# Patient Record
Sex: MALE | Race: WHITE | Employment: FULL TIME | URBAN - METROPOLITAN AREA
[De-identification: names, ages, dates, MRNs, and addresses within clinical notes are randomized per-mention and may not be internally consistent; named-entity substitution may affect disease eponyms.]

---

## 2024-01-14 ENCOUNTER — HOSPITAL ENCOUNTER (EMERGENCY)
Facility: HOSPITAL | Age: 24
Discharge: HOME | End: 2024-01-14
Attending: EMERGENCY MEDICINE
Payer: COMMERCIAL

## 2024-01-14 VITALS
DIASTOLIC BLOOD PRESSURE: 94 MMHG | OXYGEN SATURATION: 100 % | HEART RATE: 105 BPM | SYSTOLIC BLOOD PRESSURE: 166 MMHG | RESPIRATION RATE: 18 BRPM | TEMPERATURE: 97.8 F

## 2024-01-14 DIAGNOSIS — R36.9 PENILE DISCHARGE: Primary | ICD-10-CM

## 2024-01-14 DIAGNOSIS — N34.2 URETHRITIS: ICD-10-CM

## 2024-01-14 PROCEDURE — 87591 N.GONORRHOEAE DNA AMP PROB: CPT | Performed by: PHYSICIAN ASSISTANT

## 2024-01-14 PROCEDURE — 96372 THER/PROPH/DIAG INJ SC/IM: CPT

## 2024-01-14 PROCEDURE — 87491 CHLMYD TRACH DNA AMP PROBE: CPT | Performed by: PHYSICIAN ASSISTANT

## 2024-01-14 PROCEDURE — 87389 HIV-1 AG W/HIV-1&-2 AB AG IA: CPT | Performed by: PHYSICIAN ASSISTANT

## 2024-01-14 PROCEDURE — 87661 TRICHOMONAS VAGINALIS AMPLIF: CPT | Performed by: PHYSICIAN ASSISTANT

## 2024-01-14 PROCEDURE — 86780 TREPONEMA PALLIDUM: CPT | Performed by: PHYSICIAN ASSISTANT

## 2024-01-14 PROCEDURE — 36415 COLL VENOUS BLD VENIPUNCTURE: CPT | Performed by: PHYSICIAN ASSISTANT

## 2024-01-14 PROCEDURE — 99284 EMERGENCY DEPT VISIT MOD MDM: CPT | Performed by: PHYSICIAN ASSISTANT

## 2024-01-14 PROCEDURE — 99283 EMERGENCY DEPT VISIT LOW MDM: CPT

## 2024-01-14 PROCEDURE — 80074 ACUTE HEPATITIS PANEL: CPT | Performed by: PHYSICIAN ASSISTANT

## 2024-01-14 RX ORDER — DOXYCYCLINE HYCLATE 100 MG/1
100 CAPSULE ORAL ONCE
Status: COMPLETED | OUTPATIENT
Start: 2024-01-14 | End: 2024-01-14

## 2024-01-14 RX ORDER — DOXYCYCLINE HYCLATE 100 MG/1
100 CAPSULE ORAL 2 TIMES DAILY
Qty: 13 CAPSULE | Refills: 0 | Status: SHIPPED | OUTPATIENT
Start: 2024-01-14 | End: 2024-01-21

## 2024-01-14 RX ADMIN — DOXYCYCLINE HYCLATE 100 MG: 100 CAPSULE ORAL at 09:49

## 2024-01-14 RX ADMIN — LIDOCAINE HYDROCHLORIDE 500 MG: 10 INJECTION, SOLUTION EPIDURAL; INFILTRATION; INTRACAUDAL; PERINEURAL at 09:48

## 2024-01-14 NOTE — ED PROVIDER NOTES
History  Chief Complaint   Patient presents with    Penile Discharge     C/o penile discharge x5 days      24yo male with no significant past medical history presenting for evaluation of penile discharge x 5 days. Patient is uncircumcised and his wife currently has a yeast infection. He typically gets a yeast infection as well so he has been using an OTC cream with improvement. He started experiencing penile discharge a few days ago which is mostly clear but is milky white at times. He had leftover amoxicillin at home and he took a dose. He admits to have multiple sexual partners and is worried about STDs. He was seen at urgent care this morning and had a urinalysis which showed trace leukocytes and trace protein. He was advised to go to the ED for evaluation. He denies any fevers, chills, vomiting, testicular pain.       History provided by:  Patient   used: No    Penile Discharge  Presenting symptoms: penile discharge    Associated symptoms: no abdominal pain, no fever and no vomiting        None       History reviewed. No pertinent past medical history.    History reviewed. No pertinent surgical history.    History reviewed. No pertinent family history.  I have reviewed and agree with the history as documented.    E-Cigarette/Vaping     E-Cigarette/Vaping Substances     Social History     Tobacco Use    Smoking status: Never    Smokeless tobacco: Never   Substance Use Topics    Alcohol use: Never    Drug use: Never       Review of Systems   Constitutional:  Negative for chills and fever.   HENT:  Negative for drooling and voice change.    Eyes:  Negative for discharge and redness.   Respiratory:  Negative for shortness of breath and stridor.    Cardiovascular:  Negative for chest pain and leg swelling.   Gastrointestinal:  Negative for abdominal pain and vomiting.   Genitourinary:  Positive for penile discharge. Negative for testicular pain.   Musculoskeletal:  Negative for neck pain and neck  stiffness.   Skin:  Negative for color change and rash.   Neurological:  Negative for seizures and syncope.   Psychiatric/Behavioral:  Negative for confusion. The patient is not nervous/anxious.    All other systems reviewed and are negative.      Physical Exam  Physical Exam  Vitals and nursing note reviewed.   Constitutional:       General: He is not in acute distress.     Appearance: Normal appearance. He is not toxic-appearing.   HENT:      Head: Normocephalic and atraumatic.      Right Ear: External ear normal.      Left Ear: External ear normal.   Eyes:      General: No scleral icterus.        Right eye: No discharge.         Left eye: No discharge.      Conjunctiva/sclera: Conjunctivae normal.   Cardiovascular:      Rate and Rhythm: Normal rate.   Pulmonary:      Effort: Pulmonary effort is normal. No respiratory distress.      Breath sounds: No stridor.   Genitourinary:     Penis: Normal and uncircumcised.       Edward stage (genital): 5.      Comments: No evidence of balanitis  Musculoskeletal:         General: No deformity. Normal range of motion.      Cervical back: Normal range of motion.   Skin:     General: Skin is warm and dry.   Neurological:      General: No focal deficit present.      Mental Status: He is alert. Mental status is at baseline.      GCS: GCS eye subscore is 4. GCS verbal subscore is 5. GCS motor subscore is 6.   Psychiatric:         Mood and Affect: Mood normal.         Behavior: Behavior normal.         Vital Signs  ED Triage Vitals [01/14/24 0905]   Temperature Pulse Respirations Blood Pressure SpO2   97.8 °F (36.6 °C) 105 18 166/94 100 %      Temp Source Heart Rate Source Patient Position - Orthostatic VS BP Location FiO2 (%)   Oral Monitor Sitting Left arm --      Pain Score       --           Vitals:    01/14/24 0905   BP: 166/94   Pulse: 105   Patient Position - Orthostatic VS: Sitting         Visual Acuity      ED Medications  Medications   cefTRIAXone (ROCEPHIN) 500 mg in  lidocaine (PF) (XYLOCAINE-MPF) 1 % IM only syringe (500 mg Intramuscular Given 1/14/24 0948)   doxycycline hyclate (VIBRAMYCIN) capsule 100 mg (100 mg Oral Given 1/14/24 0949)       Diagnostic Studies  Results Reviewed       Procedure Component Value Units Date/Time    HIV 1/2 AG/AB w Reflex [facility] for 2 yr old and above [[id number]]  (Normal) Collected: 01/14/24 0933    Lab Status: Final result Specimen: Blood from Arm, Right Updated: 01/15/24 0943     HIV-1 p24 Antigen Non-Reactive     HIV-1 Antibody Non-Reactive     HIV-2 Antibody Non-Reactive     HIV Ag-Ab 5th Gen Non-Reactive    Narrative:      This 5th generation HIV Ag-Ab assay is a multiplex flow immunoassay intended for qualitative detection and differentiation of HIV-1 p24 antigen, HIV-1 (groups M and O) antibodies, and HIV-2 antibodies in human serum.  This assay is intended as an aid in the diagnosis of infection with HIV-1 and/or HIV-2, including acute (primary) HIV-1 infection. The test is validated for adult patients, including pregnant women, and in pediatric patients as young as two years of age. The performance of this assay has not been established for neonates.      RPR-Syphilis Screening (Total Syphilis IGG/IGM) [[id number]]  (Normal) Collected: 01/14/24 0933    Lab Status: Final result Specimen: Blood from Arm, Right Updated: 01/15/24 0902     Syphilis Total Antibody Non-reactive    Narrative:      Test performed on the [employer] system using multiplex flow immunoassay methodology.    Hepatitis panel, acute [[id number]]  (Normal) Collected: 01/14/24 0933    Lab Status: Final result Specimen: Blood from Arm, Right Updated: 01/15/24 0854     Hepatitis B Surface Ag Non-reactive     Hep A IgM Non-reactive     Hepatitis C Ab Non-reactive     Hep B C IgM Non-reactive    Chlamydia/GC amplified DNA by PCR [[id number]] Collected: 01/14/24 1006    Lab Status: In process Specimen: Urine, Other Updated: 01/14/24 1010    Trichomonas Vaginalis, SHADY [[id number]]  Collected: 01/14/24 1006    Lab Status: In process Specimen: Urine, Clean Catch Updated: 01/14/24 1010                   No orders to display              Procedures  Procedures         ED Course                               SBIRT 22yo+      Flowsheet Row Most Recent Value   Initial Alcohol Screen: US AUDIT-C     1. How often do you have a drink containing alcohol? 0 Filed at: 01/14/2024 0909   2. How many drinks containing alcohol do you have on a typical day you are drinking?  0 Filed at: 01/14/2024 0909   3a. Male UNDER 65: How often do you have five or more drinks on one occasion? 0 Filed at: 01/14/2024 0909   3b. FEMALE Any Age, or MALE 65+: How often do you have 4 or more drinks on one occassion? 0 Filed at: 01/14/2024 0909   Audit-C Score 0 Filed at: 01/14/2024 0909   CRISPIN: How many times in the past year have you...    Used an illegal drug or used a prescription medication for non-medical reasons? Never Filed at: 01/14/2024 0909                      Medical Decision Making  23yoM here with penile discharge x 5 days. Patient concerned for STDs. No fevers or testicular pain. He is well appearing with stable vitals. No evidence of balanitis on exam.    Will send full STD panel including GC/chlamydia and trichomonas testing. Will treat empirically with IM Rocephin and doxycycline. Advised close PCP and urology follow-up. ED return precautions discussed. Patient expressed understanding and is agreeable to plan. Patient discharged in stable condition.        Problems Addressed:  Penile discharge: acute illness or injury  Urethritis: acute illness or injury    Amount and/or Complexity of Data Reviewed  Labs: ordered.    Risk  Prescription drug management.             Disposition  Final diagnoses:   Penile discharge   Urethritis     Time reflects when diagnosis was documented in both MDM as applicable and the Disposition within this note       Time User Action Codes Description Comment    1/14/2024  9:26 AM  Letty Cifuentes Add [R36.9] Penile discharge     1/14/2024  9:26 AM Letty Cifuentes Add [N34.2] Urethritis           ED Disposition       ED Disposition   Discharge    Condition   Stable    Date/Time   Sun Jan 14, 2024 0926    Comment   Angelo Velasquez discharge to home/self care.                   Follow-up Information       Follow up With Specialties Details Why Contact Info Additional Information    Keturah Stoll MD Family Medicine Schedule an appointment as soon as possible for a visit   [address]  [address]  [facility] [address] [phone]       [facility] Emergency Department Emergency Medicine  If symptoms worsen [address] [id number] [phone] [facility] Emergency Department, [address] [place], [place], [address]            Discharge Medication List as of 1/14/2024  9:33 AM        START taking these medications    Details   doxycycline hyclate (VIBRAMYCIN) 100 mg capsule Take 1 capsule (100 mg total) by mouth 2 (two) times a day for 7 days, Starting Sun 1/14/2024, Until Sun 1/21/2024, Normal             No discharge procedures on file.    PDMP Review       None            ED Provider  Electronically Signed by             Letty Cifuentes PA-C  01/15/24 [id number]

## 2024-01-15 LAB
C TRACH DNA SPEC QL NAA+PROBE: POSITIVE
HAV IGM SER QL: NORMAL
HBV CORE IGM SER QL: NORMAL
HBV SURFACE AG SER QL: NORMAL
HCV AB SER QL: NORMAL
HIV 1+2 AB+HIV1 P24 AG SERPL QL IA: NORMAL
HIV 2 AB SERPL QL IA: NORMAL
HIV1 AB SERPL QL IA: NORMAL
HIV1 P24 AG SERPL QL IA: NORMAL
N GONORRHOEA DNA SPEC QL NAA+PROBE: NEGATIVE
TREPONEMA PALLIDUM IGG+IGM AB [PRESENCE] IN SERUM OR PLASMA BY IMMUNOASSAY: NORMAL

## 2024-01-15 NOTE — RESULT ENCOUNTER NOTE
Discussed positive chlamydia result with patient by telephone call.  Patient started on doxycycline yesterday in the ED and encouraged to continue course to completion.  Encourage patient to notify any sexual partners so that they may be treated as well.  Recommended abstinence 1 week post treatment course.  Patient verbalized understanding of plan as above, callback complete.

## 2024-01-19 LAB — T VAGINALIS RRNA SPEC QL NAA+PROBE: NEGATIVE
